# Patient Record
Sex: MALE | Race: ASIAN | NOT HISPANIC OR LATINO | URBAN - METROPOLITAN AREA
[De-identification: names, ages, dates, MRNs, and addresses within clinical notes are randomized per-mention and may not be internally consistent; named-entity substitution may affect disease eponyms.]

---

## 2017-05-23 ENCOUNTER — INPATIENT (INPATIENT)
Facility: HOSPITAL | Age: 42
LOS: 1 days | Discharge: NURSING, REHAB OR LONG-TERM CARE | End: 2017-05-25
Attending: SURGERY | Admitting: SURGERY
Payer: MEDICAID

## 2017-05-23 VITALS
SYSTOLIC BLOOD PRESSURE: 126 MMHG | TEMPERATURE: 98 F | RESPIRATION RATE: 16 BRPM | DIASTOLIC BLOOD PRESSURE: 89 MMHG | OXYGEN SATURATION: 100 % | HEART RATE: 70 BPM

## 2017-05-23 NOTE — ED ADULT TRIAGE NOTE - CHIEF COMPLAINT QUOTE
C/o abd pain and vomiting x 2 hours, went to urgent care and was told to go to ER for r/o appendicitis.

## 2017-05-24 ENCOUNTER — TRANSCRIPTION ENCOUNTER (OUTPATIENT)
Age: 42
End: 2017-05-24

## 2017-05-24 DIAGNOSIS — K35.89 OTHER ACUTE APPENDICITIS: ICD-10-CM

## 2017-05-24 LAB
ALBUMIN SERPL ELPH-MCNC: 4.6 G/DL — SIGNIFICANT CHANGE UP (ref 3.3–5)
ALP SERPL-CCNC: 75 U/L — SIGNIFICANT CHANGE UP (ref 40–120)
ALT FLD-CCNC: 48 U/L — HIGH (ref 4–41)
AST SERPL-CCNC: 30 U/L — SIGNIFICANT CHANGE UP (ref 4–40)
BASOPHILS # BLD AUTO: 0.01 K/UL — SIGNIFICANT CHANGE UP (ref 0–0.2)
BASOPHILS NFR BLD AUTO: 0.1 % — SIGNIFICANT CHANGE UP (ref 0–2)
BILIRUB SERPL-MCNC: 1 MG/DL — SIGNIFICANT CHANGE UP (ref 0.2–1.2)
BLD GP AB SCN SERPL QL: NEGATIVE — SIGNIFICANT CHANGE UP
BUN SERPL-MCNC: 11 MG/DL — SIGNIFICANT CHANGE UP (ref 7–23)
CALCIUM SERPL-MCNC: 9.5 MG/DL — SIGNIFICANT CHANGE UP (ref 8.4–10.5)
CHLORIDE SERPL-SCNC: 100 MMOL/L — SIGNIFICANT CHANGE UP (ref 98–107)
CO2 SERPL-SCNC: 26 MMOL/L — SIGNIFICANT CHANGE UP (ref 22–31)
CREAT SERPL-MCNC: 0.81 MG/DL — SIGNIFICANT CHANGE UP (ref 0.5–1.3)
EOSINOPHIL # BLD AUTO: 0.01 K/UL — SIGNIFICANT CHANGE UP (ref 0–0.5)
EOSINOPHIL NFR BLD AUTO: 0.1 % — SIGNIFICANT CHANGE UP (ref 0–6)
GLUCOSE SERPL-MCNC: 135 MG/DL — HIGH (ref 70–99)
HCT VFR BLD CALC: 46.2 % — SIGNIFICANT CHANGE UP (ref 39–50)
HGB BLD-MCNC: 15.7 G/DL — SIGNIFICANT CHANGE UP (ref 13–17)
IMM GRANULOCYTES NFR BLD AUTO: 0.2 % — SIGNIFICANT CHANGE UP (ref 0–1.5)
INR BLD: 1.02 — SIGNIFICANT CHANGE UP (ref 0.88–1.17)
LYMPHOCYTES # BLD AUTO: 0.5 K/UL — LOW (ref 1–3.3)
LYMPHOCYTES # BLD AUTO: 3.1 % — LOW (ref 13–44)
MCHC RBC-ENTMCNC: 30.8 PG — SIGNIFICANT CHANGE UP (ref 27–34)
MCHC RBC-ENTMCNC: 34 % — SIGNIFICANT CHANGE UP (ref 32–36)
MCV RBC AUTO: 90.8 FL — SIGNIFICANT CHANGE UP (ref 80–100)
MONOCYTES # BLD AUTO: 0.91 K/UL — HIGH (ref 0–0.9)
MONOCYTES NFR BLD AUTO: 5.7 % — SIGNIFICANT CHANGE UP (ref 2–14)
NEUTROPHILS # BLD AUTO: 14.57 K/UL — HIGH (ref 1.8–7.4)
NEUTROPHILS NFR BLD AUTO: 90.8 % — HIGH (ref 43–77)
PLATELET # BLD AUTO: 191 K/UL — SIGNIFICANT CHANGE UP (ref 150–400)
PMV BLD: 11.4 FL — SIGNIFICANT CHANGE UP (ref 7–13)
POTASSIUM SERPL-MCNC: 4.2 MMOL/L — SIGNIFICANT CHANGE UP (ref 3.5–5.3)
POTASSIUM SERPL-SCNC: 4.2 MMOL/L — SIGNIFICANT CHANGE UP (ref 3.5–5.3)
PROT SERPL-MCNC: 7.3 G/DL — SIGNIFICANT CHANGE UP (ref 6–8.3)
PROTHROM AB SERPL-ACNC: 11.4 SEC — SIGNIFICANT CHANGE UP (ref 9.8–13.1)
RBC # BLD: 5.09 M/UL — SIGNIFICANT CHANGE UP (ref 4.2–5.8)
RBC # FLD: 12.6 % — SIGNIFICANT CHANGE UP (ref 10.3–14.5)
RH IG SCN BLD-IMP: POSITIVE — SIGNIFICANT CHANGE UP
RH IG SCN BLD-IMP: POSITIVE — SIGNIFICANT CHANGE UP
SODIUM SERPL-SCNC: 141 MMOL/L — SIGNIFICANT CHANGE UP (ref 135–145)
WBC # BLD: 16.04 K/UL — HIGH (ref 3.8–10.5)
WBC # FLD AUTO: 16.04 K/UL — HIGH (ref 3.8–10.5)

## 2017-05-24 PROCEDURE — 88304 TISSUE EXAM BY PATHOLOGIST: CPT | Mod: 26

## 2017-05-24 PROCEDURE — 74177 CT ABD & PELVIS W/CONTRAST: CPT | Mod: 26

## 2017-05-24 RX ORDER — FENTANYL CITRATE 50 UG/ML
50 INJECTION INTRAVENOUS
Refills: 0 | Status: DISCONTINUED | OUTPATIENT
Start: 2017-05-24 | End: 2017-05-24

## 2017-05-24 RX ORDER — ACETAMINOPHEN 500 MG
325 TABLET ORAL EVERY 4 HOURS
Refills: 0 | Status: DISCONTINUED | OUTPATIENT
Start: 2017-05-24 | End: 2017-05-25

## 2017-05-24 RX ORDER — HYDROMORPHONE HYDROCHLORIDE 2 MG/ML
0.5 INJECTION INTRAMUSCULAR; INTRAVENOUS; SUBCUTANEOUS EVERY 4 HOURS
Refills: 0 | Status: DISCONTINUED | OUTPATIENT
Start: 2017-05-24 | End: 2017-05-24

## 2017-05-24 RX ORDER — PIPERACILLIN AND TAZOBACTAM 4; .5 G/20ML; G/20ML
3.38 INJECTION, POWDER, LYOPHILIZED, FOR SOLUTION INTRAVENOUS EVERY 8 HOURS
Refills: 0 | Status: DISCONTINUED | OUTPATIENT
Start: 2017-05-24 | End: 2017-05-24

## 2017-05-24 RX ORDER — SODIUM CHLORIDE 9 MG/ML
1000 INJECTION INTRAMUSCULAR; INTRAVENOUS; SUBCUTANEOUS ONCE
Refills: 0 | Status: COMPLETED | OUTPATIENT
Start: 2017-05-24 | End: 2017-05-24

## 2017-05-24 RX ORDER — OXYCODONE AND ACETAMINOPHEN 5; 325 MG/1; MG/1
1 TABLET ORAL
Qty: 30 | Refills: 0
Start: 2017-05-24 | End: 2017-05-29

## 2017-05-24 RX ORDER — FENTANYL CITRATE 50 UG/ML
25 INJECTION INTRAVENOUS
Refills: 0 | Status: DISCONTINUED | OUTPATIENT
Start: 2017-05-24 | End: 2017-05-24

## 2017-05-24 RX ORDER — ENOXAPARIN SODIUM 100 MG/ML
40 INJECTION SUBCUTANEOUS DAILY
Refills: 0 | Status: DISCONTINUED | OUTPATIENT
Start: 2017-05-24 | End: 2017-05-25

## 2017-05-24 RX ORDER — PIPERACILLIN AND TAZOBACTAM 4; .5 G/20ML; G/20ML
3.38 INJECTION, POWDER, LYOPHILIZED, FOR SOLUTION INTRAVENOUS ONCE
Refills: 0 | Status: COMPLETED | OUTPATIENT
Start: 2017-05-24 | End: 2017-05-24

## 2017-05-24 RX ORDER — ONDANSETRON 8 MG/1
4 TABLET, FILM COATED ORAL ONCE
Refills: 0 | Status: DISCONTINUED | OUTPATIENT
Start: 2017-05-24 | End: 2017-05-24

## 2017-05-24 RX ORDER — SODIUM CHLORIDE 9 MG/ML
1000 INJECTION, SOLUTION INTRAVENOUS
Refills: 0 | Status: DISCONTINUED | OUTPATIENT
Start: 2017-05-24 | End: 2017-05-24

## 2017-05-24 RX ADMIN — FENTANYL CITRATE 50 MICROGRAM(S): 50 INJECTION INTRAVENOUS at 13:30

## 2017-05-24 RX ADMIN — FENTANYL CITRATE 50 MICROGRAM(S): 50 INJECTION INTRAVENOUS at 13:45

## 2017-05-24 RX ADMIN — SODIUM CHLORIDE 100 MILLILITER(S): 9 INJECTION, SOLUTION INTRAVENOUS at 07:55

## 2017-05-24 RX ADMIN — SODIUM CHLORIDE 1000 MILLILITER(S): 9 INJECTION INTRAMUSCULAR; INTRAVENOUS; SUBCUTANEOUS at 00:28

## 2017-05-24 RX ADMIN — PIPERACILLIN AND TAZOBACTAM 200 GRAM(S): 4; .5 INJECTION, POWDER, LYOPHILIZED, FOR SOLUTION INTRAVENOUS at 06:14

## 2017-05-24 NOTE — DISCHARGE NOTE ADULT - HOSPITAL COURSE
42 yo M, Mandarin-speaking, presented, started in the epigastric region, radiated to the RLQ, associated with nausea and a few episodes of emesis. Given persistent pain, patient went to the ED. Patient was found to have acute appendicitis and was admitted to undergo laparoscopic appendectomy. Patient tolerated the procedure well and was transferred to the floor in stable condition. Postoperatively, the patient's diet was advanced as tolerated and the patient was placed on oral pain medication. Once patient was ambulating well, voiding without difficulty, and tolerating a regular diet, and remaining hemodynamically stable, Dr. Nam cleared the patient for discharge to home. Pain was well-controlled at time of discharge.

## 2017-05-24 NOTE — ED ADULT NURSE NOTE - OBJECTIVE STATEMENT
Pt sent to ED by [facility] care for abd pain to r/o appendicitis. Pt appearing calm/cooperative, pleasant affect. Mandarin/Chinese spk - family @ bedside to translate.  VS as noted. IVL placed R AC 20g, labs obtained, IVF NS Bolus running, PO Contrast admin - awaiting CT Abd. Will CTM

## 2017-05-24 NOTE — ED PROVIDER NOTE - OBJECTIVE STATEMENT
40 yo M with no significant PMHx, presents to the ED from urgent care for abdominal pain and rule out appendicitis. Pt states a few hours after dinner he developed periumbilical abd pain. He took Omeprazole with no relief. 2 hours later pain moved to RLQ and it persisted. Sent from urgent care to ED . Denies fevers, chills, nausea, vomiting, diarrhea, recent travel, sick contacts, or other complaints

## 2017-05-24 NOTE — DISCHARGE NOTE ADULT - PATIENT PORTAL LINK FT
“You can access the [employer] Patient Portal, offered by [facility], by registering with the following website: [email or web address]”

## 2017-05-24 NOTE — H&P ADULT - NSHPPHYSICALEXAM_GEN_ALL_CORE
Tm 36.8 HR 68 /87 RR 16 O2 100% on RA  Gen: NAD  Lungs: Clear  Heart: S1, S2  Abdomen: Soft, ND, RLQ TTP  Extremities: No deformities

## 2017-05-24 NOTE — DISCHARGE NOTE ADULT - CARE PLAN
Principal Discharge DX:	Other acute appendicitis  Goal:	recover from laparoscopic appendectomy  Instructions for follow-up, activity and diet:	FOLLOW-UP: Please follow up with your primary care physician in one week regarding your hospitalization, and follow up with your surgeon, Dr. Nam, in 7-10 days. Call to schedule an appointment.  BATHING: Please do not submerge wound underwater. You may shower and/or sponge bathe.  ACTIVITY: No heavy lifting or straining. Otherwise, you may return to your usual level of physical activity. If you are taking narcotic pain medication (such as Percocet) DO NOT drive a car, operate machinery or make important decisions.  DIET: You may return to your regular diet.  NOTIFY YOUR SURGEON IF: You have any bleeding that does not stop, any pus draining from your wound(s), any fever (over 100.4 F) or chills, persistent nausea/vomiting, persistent diarrhea, or if your pain is not controlled on your discharge pain medications.

## 2017-05-24 NOTE — H&P ADULT - NSHPLABSRESULTS_GEN_ALL_CORE
Labs:    05-24-17    WBC: 16.04  Hgb: 15.7  Hct: 46.2  Plt: 191    Na: 141  K: 4.2  Cl: 100  HCO3: 26  BUN: 11  Cr: 0.81  Glu: 135    Ca: 9.5    Protein: 7.3  Albumin: 4.6  Total bilirubin: 1.0  AST: 30  ALT: 48    INR: 1.02    CT: 1.1 cm appendix. Acute appendicitis.

## 2017-05-24 NOTE — PROGRESS NOTE ADULT - SUBJECTIVE AND OBJECTIVE BOX
A Team Surgery Post Op Note     STATUS POST:  s/p Lap Appy     SUBJECTIVE: Pt seen s/p   SOB:  [ ] YES [X] NO  Chest Discomfort: [ ] YES [X] NO    Nausea: [ ] YES [X] NO           Vomiting: [ ] YES [X] NO  Flatus: [ ] YES [X] NO             Bowel Movement: [ ] YES [X] NO  Diarrhea: [ ] YES [X] NO         Void: [ ]YES [X]No  Constipation: [ ] YES [X] NO     Pain Control Adequate: [X] YES [ ] NO      Vital Signs Last 24 Hrs  T(C): 36.6, Max: 37.1 (05-24 @ 14:15)  T(F): 97.8, Max: 98.8 (05-24 @ 14:15)  HR: 83 (64 - 83)  BP: 126/85 (114/76 - 135/92)  BP(mean): --  RR: 14 (12 - 20)  SpO2: 100% (97% - 100%)  Field:  NGT:  I&O's Summary    I & Os for current day (as of 24 May 2017 16:22)  =============================================  IN: 120 ml / OUT: 0 ml / NET: 120 ml    I&O's Detail    I & Os for current day (as of 24 May 2017 16:22)  =============================================  IN:    Oral Fluid: 120 ml    Total IN: 120 ml  ---------------------------------------------  OUT:    Total OUT: 0 ml  ---------------------------------------------  Total NET: 120 ml      PHYSICAL EXAM:    Constitutional: Patient well nourish. well developed.    Respiratory:  Lungs CTA, B/L, no rales , no wheezing, no rhonchi.    Cardiovascular:  S1, S2, RRR    Gastrointestinal: Abdomen soft, non distended, appropriately tender, incision sites c/d/i    Extremities:  No edema, no calf tenderrness,      LABS:                        15.7   16.04 )-----------( 191      ( 24 May 2017 00:30 )             46.2     05-24    141  |  100  |  11  ----------------------------<  135<H>  4.2   |  26  |  0.81    Ca    9.5      24 May 2017 00:30    TPro  7.3  /  Alb  4.6  /  TBili  1.0  /  DBili  x   /  AST  30  /  ALT  48<H>  /  AlkPhos  75  05-24    PT/INR - ( 24 May 2017 05:40 )   PT: 11.4 SEC;   INR: 1.02

## 2017-05-24 NOTE — DISCHARGE NOTE ADULT - PLAN OF CARE
recover from laparoscopic appendectomy FOLLOW-UP: Please follow up with your primary care physician in one week regarding your hospitalization, and follow up with your surgeon, Dr. Nam, in 7-10 days. Call to schedule an appointment.  BATHING: Please do not submerge wound underwater. You may shower and/or sponge bathe.  ACTIVITY: No heavy lifting or straining. Otherwise, you may return to your usual level of physical activity. If you are taking narcotic pain medication (such as Percocet) DO NOT drive a car, operate machinery or make important decisions.  DIET: You may return to your regular diet.  NOTIFY YOUR SURGEON IF: You have any bleeding that does not stop, any pus draining from your wound(s), any fever (over 100.4 F) or chills, persistent nausea/vomiting, persistent diarrhea, or if your pain is not controlled on your discharge pain medications.

## 2017-05-24 NOTE — DISCHARGE NOTE ADULT - INSTRUCTIONS
You may return to a regular diet. Keep incision site intact dry and clean, any redness, drainage, fever or nausea, vomiting call md, f/u md orders and instructions.

## 2017-05-24 NOTE — ED PROVIDER NOTE - MEDICAL DECISION MAKING DETAILS
RLQ pain story suspicious for acute appendicitis. Pt is declining pain meds at this time. Will give fluids, CT abd pelvis.

## 2017-05-24 NOTE — ED PROVIDER NOTE - PROGRESS NOTE DETAILS
ALONSO Tijerina- CT + for acute appendicitis, spoke with pt about results with  phone, #[phone]. Pt understands and is agreeable to admission. The scribe's documentation has been prepared under my direction and personally reviewed by me in its entirety. I confirm that the note above accurately reflects all work, treatment, procedures, and medical decision making performed by me. Pennie Alexis MS, PA-C.

## 2017-05-24 NOTE — H&P ADULT - ASSESSMENT
41 M presents with RLQ abdominal pain for 1 day. Found to have a WBC of 16 and a CT scan revealing acute appendicitis. Will admit to [facility] ([facility]) and plan for surgery.  -Pain control  -NPO  -IVF  -VTE prophylaxis  -Continue antibiotics  -Booked for laparoscopic appendectomy  -D/w attending  LEAH Garcia  [id number] 41 M presents with RLQ abdominal pain for 1 day. Found to have a WBC of 16 and a CT scan revealing acute appendicitis. Will admit to [facility] ([facility]) and plan for surgery.  -Pain control  -NPO  -IVF  -VTE prophylaxis  -Continue antibiotics  -Booked and consented for laparoscopic appendectomy  -Will obtain hepatitis panel and HIV  -D/w attending  LEAH Garcia  [id number]

## 2017-05-24 NOTE — DISCHARGE NOTE ADULT - CONDITIONS AT DISCHARGE
Pt alert and orientedx3, oob ambulate, abdomen soft, diet tolerated, incision site intact dry and clean, denies pain this time, pt dc home with his family, all dc orders and instructions given to py and family, understood.

## 2017-05-24 NOTE — H&P ADULT - HISTORY OF PRESENT ILLNESS
41 M, Mandarin-speaking, presents with abdominal pain. One day prior to admission, pain started in the epigastric region. It then radiated to the RLQ. Patient also had nausea and a few episodes of emesis. Given persistent pain, patient went to the ED. Currently, patient still has RLQ abdominal pain. Denies fever or chills. Denies diarrhea or constipation.    Patient takes tenofovir, but says he does not know the indication. Used [employer] .    41 M, Mandarin-speaking, presents with abdominal pain. One day prior to admission, pain started in the epigastric region. It then radiated to the RLQ. Patient also had nausea and a few episodes of emesis. Given persistent pain, patient went to the ED. Currently, patient still has RLQ abdominal pain. Denies fever or chills. Denies diarrhea or constipation.    Patient takes tenofovir, but says he does not know the indication.

## 2017-05-24 NOTE — DISCHARGE NOTE ADULT - MEDICATION SUMMARY - MEDICATIONS TO TAKE
I will START or STAY ON the medications listed below when I get home from the hospital:    acetaminophen-oxycodone 325 mg-5 mg oral tablet  -- 1 tab(s) by mouth every 4 hours, As needed, Moderate Pain (4 - 6) MDD:6  -- Indication: For post operative pain    tenofovir 300 mg oral tablet  -- 1 tab(s) by mouth once a day  -- Indication: For home medication

## 2017-05-24 NOTE — DISCHARGE NOTE ADULT - CARE PROVIDER_API CALL
Lenard Nam), ColonRectal Surgery; Surgery  [address]  [place] [address]  Phone: [phone]  Fax: [phone]

## 2017-05-25 VITALS
OXYGEN SATURATION: 99 % | DIASTOLIC BLOOD PRESSURE: 77 MMHG | SYSTOLIC BLOOD PRESSURE: 111 MMHG | RESPIRATION RATE: 16 BRPM | HEART RATE: 75 BPM | TEMPERATURE: 98 F

## 2017-05-25 LAB
BUN SERPL-MCNC: 8 MG/DL — SIGNIFICANT CHANGE UP (ref 7–23)
CALCIUM SERPL-MCNC: 8.8 MG/DL — SIGNIFICANT CHANGE UP (ref 8.4–10.5)
CHLORIDE SERPL-SCNC: 103 MMOL/L — SIGNIFICANT CHANGE UP (ref 98–107)
CO2 SERPL-SCNC: 26 MMOL/L — SIGNIFICANT CHANGE UP (ref 22–31)
CREAT SERPL-MCNC: 0.86 MG/DL — SIGNIFICANT CHANGE UP (ref 0.5–1.3)
GLUCOSE SERPL-MCNC: 92 MG/DL — SIGNIFICANT CHANGE UP (ref 70–99)
HCT VFR BLD CALC: 42.5 % — SIGNIFICANT CHANGE UP (ref 39–50)
HGB BLD-MCNC: 14.2 G/DL — SIGNIFICANT CHANGE UP (ref 13–17)
MCHC RBC-ENTMCNC: 30.4 PG — SIGNIFICANT CHANGE UP (ref 27–34)
MCHC RBC-ENTMCNC: 33.4 % — SIGNIFICANT CHANGE UP (ref 32–36)
MCV RBC AUTO: 91 FL — SIGNIFICANT CHANGE UP (ref 80–100)
PLATELET # BLD AUTO: 174 K/UL — SIGNIFICANT CHANGE UP (ref 150–400)
PMV BLD: 10.8 FL — SIGNIFICANT CHANGE UP (ref 7–13)
POTASSIUM SERPL-MCNC: 3.8 MMOL/L — SIGNIFICANT CHANGE UP (ref 3.5–5.3)
POTASSIUM SERPL-SCNC: 3.8 MMOL/L — SIGNIFICANT CHANGE UP (ref 3.5–5.3)
RBC # BLD: 4.67 M/UL — SIGNIFICANT CHANGE UP (ref 4.2–5.8)
RBC # FLD: 12.8 % — SIGNIFICANT CHANGE UP (ref 10.3–14.5)
SODIUM SERPL-SCNC: 144 MMOL/L — SIGNIFICANT CHANGE UP (ref 135–145)
WBC # BLD: 9.09 K/UL — SIGNIFICANT CHANGE UP (ref 3.8–10.5)
WBC # FLD AUTO: 9.09 K/UL — SIGNIFICANT CHANGE UP (ref 3.8–10.5)

## 2017-05-25 NOTE — PROGRESS NOTE ADULT - SUBJECTIVE AND OBJECTIVE BOX
ANESTHESIA POSTOP CHECK    41y Male POSTOP DAY 1 S/P     Vital Signs Last 24 Hrs  T(C): 36.7, Max: 37.1 (05-24 @ 14:15)  T(F): 98, Max: 98.8 (05-24 @ 14:15)  HR: 67 (64 - 83)  BP: 101/68 (101/68 - 138/90)  BP(mean): --  RR: 16 (12 - 20)  SpO2: 98% (97% - 100%)  I&O's Summary    I & Os for current day (as of 25 May 2017 09:54)  =============================================  IN: 320 ml / OUT: 2900 ml / NET: -2580 ml      [x ] NO APPARENT ANESTHESIA COMPLICATIONS      Comments:

## 2017-05-25 NOTE — PROGRESS NOTE ADULT - ASSESSMENT
41M s/p lap appy  - pain control prn  - diet as tolerated  - OOB/ I.S.  - d/c home 41M s/p lap appy  - pain control prn  - diet as tolerated  - OOB/ I.S.  - d/c home today.    SURJIT Carlin MD [id number]

## 2017-05-25 NOTE — PROGRESS NOTE ADULT - SUBJECTIVE AND OBJECTIVE BOX
GENERAL SURGERY DAILY PROGRESS NOTE:     Hospital Day: 2    Postoperative Day: 1    Status post: jenise ross    Subjective:              Objective:    MEDICATIONS  (STANDING):  enoxaparin Injectable 40milliGRAM(s) SubCutaneous daily    MEDICATIONS  (PRN):  oxyCODONE  5 mG/acetaminophen 325 mG 1Tablet(s) Oral every 4 hours PRN Moderate Pain (4 - 6)  oxyCODONE  5 mG/acetaminophen 325 mG 2Tablet(s) Oral every 4 hours PRN Severe Pain (7 - 10)  acetaminophen   Tablet. 325milliGRAM(s) Oral every 4 hours PRN Mild Pain (1 - 3)      Vital Signs Last 24 Hrs  T(C): 36.4, Max: 37.1 (05-24 @ 14:15)  T(F): 97.6, Max: 98.8 (05-24 @ 14:15)  HR: 70 (64 - 83)  BP: 121/65 (106/70 - 138/90)  BP(mean): --  RR: 17 (12 - 20)  SpO2: 98% (97% - 100%)    I&O's Detail    I & Os for current day (as of 25 May 2017 03:09)  =============================================  IN:    Oral Fluid: 220 ml    Total IN: 220 ml  ---------------------------------------------  OUT:    Voided: 2100 ml    Total OUT: 2100 ml  ---------------------------------------------  Total NET: -1880 ml      Daily Height in cm: 170.18 (24 May 2017 10:02)    Daily     LABS:                        15.7   16.04 )-----------( 191      ( 24 May 2017 00:30 )             46.2     05-24    141  |  100  |  11  ----------------------------<  135<H>  4.2   |  26  |  0.81    Ca    9.5      24 May 2017 00:30    TPro  7.3  /  Alb  4.6  /  TBili  1.0  /  DBili  x   /  AST  30  /  ALT  48<H>  /  AlkPhos  75  05-24    PT/INR - ( 24 May 2017 05:40 )   PT: 11.4 SEC;   INR: 1.02                RADIOLOGY & ADDITIONAL STUDIES: A-Team Surgery:  S: Pain controlled.  No nausea, vomiting, fevers, chills, dizziness, lightheadedness, or chest pain.     O:   Vitals: T(C): 36.7, Max: 37.1 (05-24 @ 14:15)  HR: 67 (64 - 83)  BP: 101/68 (101/68 - 138/90)  RR: 16 (12 - 20)  SpO2: 98% (97% - 100%)  Wt(kg): --                        14.2   9.09  )-----------( 174      ( 25 May 2017 06:00 )             42.5                         15.7   16.04 )-----------( 191      ( 24 May 2017 00:30 )             46.2       05-25    144  |  103  |  8   ----------------------------<  92  3.8   |  26  |  0.86    Ca    8.8      25 May 2017 06:00      I/O :    I & Os for current day (as of 05-25 @ 10:00)  =============================================  IN:    Oral Fluid: 320 ml    Total IN: 320 ml  ---------------------------------------------  OUT:    Voided: 2900 ml    Total OUT: 2900 ml  ---------------------------------------------  Total NET: -2580 ml    Gen: NAD  Abd: soft, nondistended, appropriately tender.  Steristrips in place.  Incisions clean, dry, and intact.

## 2017-10-18 NOTE — PRE-OP CHECKLIST - ASSESSMENT, HISTORY & PHYSICAL COMPLETED AND ON MEDICAL RECORD
Procedure Date:  10/18/2017    During your exam, the physician:  No polyps    DIET INSTRUCTIONS:  High Fiber Diet in addition to your usual diet    PRESCRIPTIONS / MEDICATIONS:  Resume medications today     A RESPONSIBLE ADULT MUST ACCOMPANY YOU AND DRIVE YOU HOME.  You had the following procedure(s) performed today:   Procedure(s):  COLONOSCOPY    1.  If biopsies were taken, and/or polyps removed:        2.  Following sedation, your judgment, perception and coordination are impaired for minimum of 12 hours.  Therefore:  · Do not drive.  Do not return to work today.  · Do not operate appliances or machinery that require quick reaction time  · Do not sign legal documents or be involved in work decisions.  · Do not smoke or drink alcoholic beverages for 24 hours  · Plan to spend a few hours resting before resuming your normal routine    Please call your physician in the event that you experience any of the following or proceed to the nearest hospital in the event of an emergency:    If you have any questions or concerns, contact Dr. Gaona at [phone]    For Upper Endoscopy  · Difficulty swallowing or breathing  · Neck swelling  · Excessive pain, you may have mild chest pain or discomfort which should pass in 1-2 hours with the passage of air.  · Nausea or vomiting  · Abdominal distention  · Fever  Mild throat soreness may follow this procedure.  Warm salt-water gargle or lozenges may relieve your discomfort    For Colonoscopy / Sigmoidoscopy  · Severe abdominal distention or pain. Some mild distention and/or cramping are normal after these procedures but should pass within an hour or two with the passage of air.  · Rectal bleeding more than blood streaking on the toilet tissue  · Nausea or vomiting  · Fever    ADDITIONAL INSTRUCTIONS:        Good fiber and fluid in diet  Avoid large seeds and nuts  Repeat colon in 5 yrs   done

## 2019-02-11 NOTE — DISCHARGE NOTE ADULT - NS MD DC FALL RISK RISK
See monthly summary comments.   For information on Fall & Injury Prevention, visit [email or web address]

## 2020-10-21 NOTE — ED ADULT NURSE NOTE - PAIN RATING/NUMBER SCALE (0-10): REST
4 breath sounds are clear bilaterally, prominent systolic murmur over aortic valve  patient reports weakness and dizziness that lasts for past week

## 2023-08-25 NOTE — BRIEF OPERATIVE NOTE - URINE OUTPUT
350 Coagulation: Bleeding disorder either through use of anticoagulants or underlying clinical condition(s)

## 2023-10-11 RX ORDER — TENOFOVIR DISOPROXIL FUMARATE 300 MG/1
1 TABLET, FILM COATED ORAL
Qty: 0 | Refills: 0 | DISCHARGE

## 2025-01-11 NOTE — ED ADULT NURSE NOTE - NS ED NURSE DISCH DISPOSITION
Admitted